# Patient Record
Sex: MALE | Race: WHITE
[De-identification: names, ages, dates, MRNs, and addresses within clinical notes are randomized per-mention and may not be internally consistent; named-entity substitution may affect disease eponyms.]

---

## 2018-10-28 ENCOUNTER — HOSPITAL ENCOUNTER (EMERGENCY)
Dept: HOSPITAL 25 - ED | Age: 23
Discharge: HOME | End: 2018-10-28
Payer: COMMERCIAL

## 2018-10-28 VITALS — DIASTOLIC BLOOD PRESSURE: 78 MMHG | SYSTOLIC BLOOD PRESSURE: 118 MMHG

## 2018-10-28 DIAGNOSIS — R61: ICD-10-CM
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
## 2018-10-28 DIAGNOSIS — T37.0X5A: ICD-10-CM

## 2018-10-28 DIAGNOSIS — R00.2: ICD-10-CM

## 2018-10-28 DIAGNOSIS — R42: Primary | ICD-10-CM

## 2018-10-28 PROCEDURE — 99282 EMERGENCY DEPT VISIT SF MDM: CPT

## 2018-10-28 NOTE — ED
Allergic Reaction/Systemic





- HPI Summary


HPI Summary: 





A 22 y/o M presents to ED s/p possible allergic reaction to prescription onset 

yesterday. Pt had a staph infection "a few months ago" that was treated. He 

thought he had a new infection, and [place] [facility] [place] He was 

prescribed Bactrim which he began taking four days ago, he last took it 

yesterday morning. Yesterday, he began having dizziness, diaphoresis, racing 

palpitations. He has tried eating and drinking to no relief.  He states still 

feeling "off" at bedside. [place] [facility] [place] that he go to 

the ED for further evaluation. He denies dyspnea, fever, swelling of lips and 

tongue, cold-like symptoms, rash, pedal edema. He states the area on his nose 

where he thought the infection was has improved. Denies PMHx. Pt lives in an 

apartment. 











- History of Current Complaint


Chief Complaint: EDAllergicReaction


Time Seen by Provider: 10/28/18 18:02


Hx Obtained From: Patient, Other: - friend present


Onset/Duration: Gradual Onset, Started days ago - yesterday, Still Present


Timing: Constant


Severity Initially: Moderate


Severity Currently: Mild


Pain Intensity: 0


Pain Scale Used: 0-10 Numeric


Associated Signs And Symptoms: Positive: Diaphoresis, Other: - pos: dizziness, 

racing palpitations. neg: fever, swelling of lips and tongue, cold-like symptoms

, pedal edema.  Negative: Difficulty Breathing, Rash





- Allergies/Home Medications


Allergies/Adverse Reactions: 


 Allergies











Allergy/AdvReac Type Severity Reaction Status Date / Time


 


No Known Allergies Allergy   Verified 10/28/18 17:49














PMH/Surg Hx/FS Hx/Imm Hx


Previously Healthy: No - staph infection


Endocrine/Hematology History: 


   Denies: Hx Diabetes


Cardiovascular History: 


   Denies: Hx Hypertension, Hx Pacemaker/ICD


 History: 


   Denies: Hx Renal Disease


Sensory History: 


   Denies: Hx Hearing Aid


Psychiatric History: 


   Denies: Hx Panic Disorder


Infectious Disease History: No


Infectious Disease History: 


   Denies: Traveled Outside the [place] in Last 30 Days





- Family History


Family History: Non-contributory.





- Social History


Occupation: Student


Lives: Dormitory/Roommates


Alcohol Use: Rare


Substance Use Type: Reports: None


Smoking Status (MU): Never Smoked Tobacco





Review of Systems


Positive: Skin Diaphoresis.  Negative: Fever


ENT: Negative


Negative: Other - neg: edema of lips/tongue


Positive: Palpitations


Respiratory: Negative


Negative: Other - neg: dyspnea


Negative: Edema - neg: pedal edema


Negative: Rash


Neurological: Other - pos: dizziness


All Other Systems Reviewed And Are Negative: Yes





Physical Exam





- Summary


Physical Exam Summary: 





Appearance: Well appearing, no pain distress


Skin: warm, dry, reflects adequate perfusion


Head/face: normal


Eyes: EOMI, BRIANA


ENT: mucous membranes moist


Neck: supple, non-tender


Respiratory: CTA, breath sounds present


Cardiovascular: RRR, pulses symmetrical 


Abdomen: non-tender, soft


Bowel Sounds: present


Musculoskeletal: normal, strength/ROM intact


Neuro: normal, sensory motor intact, A&Ox3





Triage Information Reviewed: Yes


Vital Signs On Initial Exam: 


 Initial Vitals











Temp Pulse Resp BP Pulse Ox


 


 98.6 F   87   16   116/71   96 


 


 10/28/18 17:46  10/28/18 17:46  10/28/18 17:46  10/28/18 17:46  10/28/18 17:46











Vital Signs Reviewed: Yes





Diagnostics





- Vital Signs


 Vital Signs











  Temp Pulse Resp BP Pulse Ox


 


 10/28/18 17:46  98.6 F  87  16  116/71  96














- Laboratory


Lab Statement: Any lab studies that have been ordered have been reviewed, and 

results considered in the medical decision making process.





Allergic Reaction Course/Dx





- Course


Course Of Treatment: Patient had been feeling lightheaded on Bactrim for a 

superficial skin infection of the nose.  This has since resolved.  He 

discontinue the medication.  Vitals home normal with regular heartbeat.  No 

significant orthostasis but he was treated with IV fluids with resolution of 

symptoms.  Discharged to follow-up with his [facility].





- Diagnoses


Differential Diagnosis/HQI/PQRI: Positive: Anaphylaxis, Local Allergic Reaction

, Other - Vertigo, upper respiratory infection


Provider Diagnoses: 


 Medication side effect, Lightheadedness








Discharge





- Sign-Out/Discharge


Documenting (check all that apply): Patient Departure - DC





- Discharge Plan


Condition: Improved


Disposition: HOME


Patient Education Materials:  Lightheadedness (ED)


Referrals: 


Critical access hospital,[facility] [Z.[email or web address], APPLICATION, OTHER] - 


Additional Instructions: 


Do not take sulfa medications. Drink plenty of fluids. Avoid exertion until you 

are feeling well. Follow up with [facility]. Return if worse, 

new symptoms or other concerns.





- Billing Disposition and Condition


Condition: IMPROVED


Disposition: Home





- Attestation Statements


Document Initiated by Claudine: Yes


Documenting Scribe: Liang Moya


Provider For Whom Claudine is Documenting (Include Credential): Dr. Buzz Martin MD


Scribe Attestation: 


I, Liang Moya, scribed for Dr. Buzz Martin MD on 10/28/18 at 2157. 


Scribe Documentation Reviewed: Yes


Provider Attestation: 


The documentation as recorded by the claudine, Liang Moya accurately 

reflects the service I personally performed and the decisions made by me, Dr. Buzz Martin MD

## 2019-04-24 ENCOUNTER — HOSPITAL ENCOUNTER (EMERGENCY)
Dept: HOSPITAL 25 - ED | Age: 24
Discharge: HOME | End: 2019-04-24
Payer: COMMERCIAL

## 2019-04-24 VITALS — SYSTOLIC BLOOD PRESSURE: 109 MMHG | DIASTOLIC BLOOD PRESSURE: 61 MMHG

## 2019-04-24 DIAGNOSIS — M62.830: Primary | ICD-10-CM

## 2019-04-24 DIAGNOSIS — Z88.2: ICD-10-CM

## 2019-04-24 PROCEDURE — 99282 EMERGENCY DEPT VISIT SF MDM: CPT

## 2019-04-24 NOTE — ED
Back Pain





- HPI Summary


HPI Summary: 





Patient complains of sudden onset left side mid back pain while jumping rope.  

Denies fall, head injury, urinary retention, incontinence, loss of sensation in 

lower extremities.  Patient ambulatory.  Denies any other pain injury or 

symptoms.  Medical history is none.





- History of Current Complaint


Chief Complaint: EDBackInjuryPain


Stated Complaint: BACK PAIN PER EMS


Time Seen by Provider: 04/24/19 17:42


Hx Obtained From: Patient


Onset/Duration: Sudden Onset, Lasting Hours


Onset/Duration: Started Hours Ago


Timing: Constant


Back Pain Location: Is Discrete @


Severity Initially: Severe


Severity Currently: Severe


Pain Intensity: 10


Pain Scale Used: 0-10 Numeric


Character: Throbbing, Spasmodic


Aggravating Symptom(s): Movement, Bending


Associated Signs And Symptoms: Positive: Negative





- Allergies/Home Medications


Allergies/Adverse Reactions: 


 Allergies











Allergy/AdvReac Type Severity Reaction Status Date / Time


 


sulfamethoxazole Allergy  Dizziness Verified 04/24/19 15:56





[From Bactrim]     


 


trimethoprim [From Bactrim] Allergy  Dizziness Verified 04/24/19 15:56














PMH/Surg Hx/FS Hx/Imm Hx


Endocrine/Hematology History: 


   Denies: Hx Diabetes


Cardiovascular History: 


   Denies: Hx Hypertension, Hx Pacemaker/ICD


 History: 


   Denies: Hx Renal Disease


Sensory History: 


   Denies: Hx Hearing Aid


Opthamlomology History: 


   Denies: Hx Eye Prosthesis


EENT History: 


   Denies: Hx Deafness


Neurological History: 


   Denies: Hx Dementia


Psychiatric History: 


   Denies: Hx Panic Disorder


Infectious Disease History: No


Infectious Disease History: 


   Denies: Traveled Outside the [place] in Last 30 Days





- Family History


Family History: Non-contributory.





- Social History


Alcohol Use: Weekly


Substance Use Type: Reports: None


Smoking Status (MU): Never Smoked Tobacco





Review of Systems


Constitutional: Negative


Eyes: Negative


ENT: Negative


Cardiovascular: Negative


Respiratory: Negative


Gastrointestinal: Negative


Genitourinary: Negative


Musculoskeletal: Other


Skin: Negative


Neurological: Negative


Psychological: Normal


All Other Systems Reviewed And Are Negative: Yes





Physical Exam





- Summary


Physical Exam Summary: 





Tenderness to paraspinal muscles along thoracic spinal left side.  Back exam 

otherwise unremarkable.  PMS intact on bilateral lower extremities.  Abdomen 

soft nontender.  Lung sounds clear to auscultation bilaterally.  RRR.


Triage Information Reviewed: Yes


Vital Signs On Initial Exam: 


 Initial Vitals











Temp Pulse Resp BP Pulse Ox


 


 97.8 F   75   16   124/75   99 


 


 04/24/19 15:49  04/24/19 15:49  04/24/19 15:49  04/24/19 15:49  04/24/19 15:49











Vital Signs Reviewed: Yes


Appearance: Positive: Well-Appearing


Skin: Positive: Warm


Head/Face: Positive: Normal Head/Face Inspection


Eyes: Positive: Normal


Neck: Positive: Supple


Respiratory/Lung Sounds: Positive: Clear to Auscultation


Cardiovascular: Positive: Normal


Abdomen Description: Positive: Nontender


Musculoskeletal: Positive: Normal


Neurological: Positive: Normal


Psychiatric: Positive: Normal


AVPU Assessment: Alert





- Rover Coma Scale


Best Eye Response: 4 - Spontaneous


Best Motor Response: 6 - Obeys Commands


Best Verbal Response: 5 - Oriented


Coma Scale Total: 15





Diagnostics





- Vital Signs


 Vital Signs











  Temp Pulse Resp BP Pulse Ox


 


 04/24/19 18:20    16  


 


 04/24/19 18:00   62    100


 


 04/24/19 17:52   66   112/65  98


 


 04/24/19 17:23   76   116/58  99


 


 04/24/19 17:00   68    98


 


 04/24/19 16:52   71   113/63  99


 


 04/24/19 16:22   73   107/68  98


 


 04/24/19 16:00   73    99


 


 04/24/19 15:52   78   118/79  99


 


 04/24/19 15:49  97.8 F  75  16  124/75  99














- Laboratory


Lab Statement: Any lab studies that have been ordered have been reviewed, and 

results considered in the medical decision making process.





Back Pain Course/Dx





- Course


Course Of Treatment: Patient complains of sudden onset left side mid back pain 

while jumping rope.  Denies fall, head injury, urinary retention, incontinence, 

loss of sensation in lower extremities.  Patient ambulatory.  Denies any other 

pain injury or symptoms.  Medical history is none.  Physical exam:Tenderness to 

paraspinal muscles along thoracic spinal left side.  Back exam otherwise 

unremarkable.  PMS intact on bilateral lower extremities.  Abdomen soft 

nontender.  Lung sounds clear to auscultation bilaterally.  RRR.  Vital signs 

within normal limits.  Patient's symptoms improved with Valium 5 mg by mouth.  

Patient discharged home in stable condition with diagnosis of muscle spasm.  Rx 

for Valium.





- Diagnoses


Provider Diagnoses: 


 Muscle spasm








Discharge





- Sign-Out/Discharge


Documenting (check all that apply): Patient Departure


Patient Received Moderate/Deep Sedation with Procedure: No





- Discharge Plan


Condition: Stable


Disposition: HOME


Prescriptions: 


Diazepam TAB(*) [Valium TAB(*)] 5 mg PO TID PRN 2 Days #6 tab MDD 3 tabs


 PRN Reason: Pain


Patient Education Materials:  Muscle Spasm (ED)


Referrals: 


No Primary Care Phys,NOPCP [Primary Care Provider] - 


Additional Instructions: 


Rest.  Avoid strenuous activity until symptoms improve.  Take Valium as 

directed.  Do not drive or operative machinery while on this medication as it 

can make you drowsy.  Follow-up with orthopedics Dr Villa if symptoms 

persist.  Return to the ED for any new or worsening symptoms.





- Billing Disposition and Condition


Condition: STABLE


Disposition: Home